# Patient Record
Sex: MALE | Race: WHITE | URBAN - METROPOLITAN AREA
[De-identification: names, ages, dates, MRNs, and addresses within clinical notes are randomized per-mention and may not be internally consistent; named-entity substitution may affect disease eponyms.]

---

## 2018-01-01 ENCOUNTER — EXTERNAL NURSING HOME DOCUMENTATION (OUTPATIENT)
Dept: INTERNAL MEDICINE CLINIC | Age: 68
End: 2018-01-01

## 2018-01-01 DIAGNOSIS — J96.01 ACUTE RESPIRATORY FAILURE WITH HYPOXIA (HCC): ICD-10-CM

## 2018-01-01 DIAGNOSIS — D64.9 ANEMIA, UNSPECIFIED TYPE: ICD-10-CM

## 2018-01-01 DIAGNOSIS — I71.40 ABDOMINAL AORTIC ANEURYSM (AAA) WITHOUT RUPTURE: ICD-10-CM

## 2018-01-01 DIAGNOSIS — J96.01 ACUTE RESPIRATORY FAILURE WITH HYPOXIA (HCC): Primary | ICD-10-CM

## 2018-01-01 DIAGNOSIS — Z72.0 TOBACCO USE: ICD-10-CM

## 2018-01-01 DIAGNOSIS — J44.9 CHRONIC OBSTRUCTIVE PULMONARY DISEASE, UNSPECIFIED COPD TYPE (HCC): Primary | ICD-10-CM

## 2018-01-01 DIAGNOSIS — R91.1 PULMONARY NODULE: ICD-10-CM

## 2018-01-01 DIAGNOSIS — K76.9 LIVER LESION: ICD-10-CM

## 2018-01-01 DIAGNOSIS — J44.9 CHRONIC OBSTRUCTIVE PULMONARY DISEASE, UNSPECIFIED COPD TYPE (HCC): ICD-10-CM

## 2018-01-01 DIAGNOSIS — D69.6 THROMBOCYTOPENIA (HCC): ICD-10-CM

## 2018-01-01 DIAGNOSIS — R91.8 PULMONARY NODULES: ICD-10-CM

## 2018-01-01 DIAGNOSIS — L30.9 DERMATITIS: Primary | ICD-10-CM

## 2018-01-01 DIAGNOSIS — J44.1 COPD EXACERBATION (HCC): ICD-10-CM

## 2018-01-01 DIAGNOSIS — J44.1 COPD EXACERBATION (HCC): Primary | ICD-10-CM

## 2018-01-01 DIAGNOSIS — R55 SYNCOPE, UNSPECIFIED SYNCOPE TYPE: ICD-10-CM

## 2018-01-01 RX ORDER — SENNOSIDES 8.6 MG/1
1 TABLET ORAL DAILY
Qty: 30 TAB | Refills: 0 | Status: SHIPPED | OUTPATIENT
Start: 2018-01-01

## 2018-01-01 RX ORDER — POLYETHYLENE GLYCOL 3350 17 G/17G
17 POWDER, FOR SOLUTION ORAL
Qty: 510 G | Refills: 0 | Status: SHIPPED | OUTPATIENT
Start: 2018-01-01

## 2018-01-01 RX ORDER — DOCUSATE SODIUM 100 MG/1
100 CAPSULE, LIQUID FILLED ORAL 2 TIMES DAILY
Qty: 60 CAP | Refills: 0 | Status: SHIPPED | OUTPATIENT
Start: 2018-01-01

## 2018-01-01 RX ORDER — BUDESONIDE AND FORMOTEROL FUMARATE DIHYDRATE 160; 4.5 UG/1; UG/1
2 AEROSOL RESPIRATORY (INHALATION) 2 TIMES DAILY
Qty: 1 INHALER | Refills: 0 | Status: SHIPPED | OUTPATIENT
Start: 2018-01-01

## 2018-01-01 RX ORDER — ALBUTEROL SULFATE 0.83 MG/ML
2.5 SOLUTION RESPIRATORY (INHALATION)
Qty: 30 EACH | Refills: 0 | Status: SHIPPED | OUTPATIENT
Start: 2018-01-01

## 2018-01-01 RX ORDER — METOPROLOL TARTRATE 25 MG/1
12.5 TABLET, FILM COATED ORAL 2 TIMES DAILY
Qty: 30 TAB | Refills: 0 | Status: SHIPPED | OUTPATIENT
Start: 2018-01-01

## 2018-09-14 NOTE — PROGRESS NOTES
History of Present Illness:   Ms. Leon Wang is a 79year-old white male with past medical history significant for COPD and tobacco abuse and chronic thrombocytopenia. He presented to the [facility] after transfer from [facility] for management of acute hypoxic respiratory failure. The patient had a CT scan done, which was suspicious of aspiration pneumonia with underlying COPD. He uses home oxygen, but also uses an inhaler and Spiriva on admission. He requires 4 liters of oxygen and was started on Augmentin. Slowly in the hospital, his oxygen has been weaned off and titrated down to 2 liters. Cough has improved. He was stabilized and was transferred to [facility]. He is doing well here in the rehab not in any distress. Past Medical History: 1. Acute hypoxic respiratory failure. 2. Syncope. 3. COPD. 4. Pulmonary nodule. 5. Infrarenal AAA. 6. Dental abscess. 7. hyperkeratosis of foot. Past Surgical History:   Bilateral cataract surgery with intraocular lens implantation. Social History:  The patient is a smoker. He smoked two packs per day from age 15. He never drinks any alcohol. He lives by himself in an apartment. Family History:  Not significant for any respiratory illness. Allergies:  He is not allergic to any medications. Medications:  Tylenol 325 mg two tablets every four hours as needed, Albuterol nebulizer every six hours as needed, Augmentin 875 mg every 12 hours for ten days, Budesonide/formeterol 80/4.5 mcg two puffs twice a day, Colace 100 mg tablets twice a day, Ipratropium solution one vial every six hours as needed, Metoprolol 12.5 mg twice a day, Senna 8.6 mg tablets every day. Review of Systems:  A complete review of systems was done. Right now, essentially negative. Physical Examination:    
GENERAL:  This is a pleasant, white male not appearing in any distress. VITALS:  T:  98.3 degrees Fahrenheit. P:  94 per minute. BP:  132/88 mmHg. SaO2:  100% on room air. Weight is 103 pounds. HEENT:  No abnormality detected. NECK:  Supple, no JVD, no carotid bruits, no thyromegaly. CHEST:  Chest is clear to auscultation bilaterally. No rales, no rhonchi. CARDIOVASCULAR:  S1, S2 normal.  Regular rate and rhythm. ABDOMEN:  Soft, nontender, nondistended, bowel sounds present. EXTREMITIES:  No edema is noticed. Dorsalis pedis pulse normal.   
NEUROLOGICAL:  Alert and oriented x3. Cranial nerves II - XII grossly intact. Motor is 5/5 bilaterally. Sensory is within normal limits. Assessment and Plan: 1. Acute hypoxic respiratory failure due to COPD exacerbation and aspiration pneumonia. CT of chest confirmed pneumonia. The patient has received IV antibiotics followed by p.o. antibiotics. Right now, no cough. Pneumonia has resolved. We will continue antibiotics, Symbicort and DuoNeb nebulizer and Spiriva. He will follow up with pulmonary. 2. History of syncope. The patient had workup done. EEG was done, which was normal.  It was probably cardiac or pulmonary. Other possibilities are vasovagal syncope too. The patient will continue Metoprolol twice a day for MAT. 3.  hyperkeratosis of the foot. Patient is to continue Vaseline for now. 4. Anemia. Last hemoglobin was 10.6. He is stable right now. 5. Infrarenal AAA. The patient had a CT of the abdomen that showed small infrarenal AAA with slight increase in size. We will continue to monitor it. 6. Liver lesion. Cyst versus hemangioma. Ultrasound at [facility] has evidence of mild hepatic parenchymal disease. No inpatient intervention was advised right now. 7. Pulmonary nodules. Given history of smoking, high risk for malignancy. The patient needs a CT of the chest repeated in three months.    
 
 
THIS IS NOT A COMPLETE MEDICAL RECORD ON THIS PATIENT.   
 THIS IS A PATIENT AT [facility]. PLEASE CONTACT THE FACILITY LISTED BELOW FOR MORE INFORMATION ON THIS PATIENT.   
THE COMPLETE RECORD RESIDES WITH THIS LONG TERM CARE FACILITY

## 2018-09-17 NOTE — PROGRESS NOTES
THIS IS NOT A COMPLETE MEDICAL RECORD ON THIS PATIENT. THIS IS A PATIENT AT [facility]. PLEASE CONTACT THE FACILITY LISTED BELOW FOR MORE INFORMATION ON THIS PATIENT. THE COMPLETE RECORD RESIDES WITH THIS LONG TERM CARE FACILITY. HISTORY OF PRESENT ILLNESS Radha Franklin is a 79 y.o. male. This patient is currently under the care of Dr. Angela Kitchen at [facility]. 
The patient was admitted to this facility following hospitalization related to acute respiratory failure. His past medical history includes chronic thrombocytopenia, anemia, AAA, COPD, and pulmonary nodules. The patient is seen today for follow-up. He states he is feeling well at time of visit. He denies chest pain and shortness of breath. Nursing does not report any concerns or changes in condition at this time. HPI Review of Systems Constitutional: Negative for chills and fever. HENT: Negative for congestion. Respiratory: Negative for cough and shortness of breath. Cardiovascular: Negative for chest pain. Gastrointestinal: Negative for abdominal pain. Genitourinary: Negative. Musculoskeletal: Negative. Neurological: Negative for dizziness and headaches. Endo/Heme/Allergies: Negative. Psychiatric/Behavioral: Negative. Physical Exam  
Constitutional: He is oriented to person, place, and time. He appears well-developed. No distress. HENT:  
Head: Normocephalic and atraumatic. Cardiovascular: Normal rate and regular rhythm. Pulmonary/Chest: Effort normal and breath sounds normal. No respiratory distress. He has no wheezes. He has no rales. Abdominal: Soft. Bowel sounds are normal. He exhibits no distension. There is no tenderness. Musculoskeletal: He exhibits no edema. Neurological: He is alert and oriented to person, place, and time. Skin: Skin is warm and dry. Psychiatric: He has a normal mood and affect. ASSESSMENT and PLAN Encounter Diagnoses Name Primary?  Chronic obstructive pulmonary disease, unspecified COPD type ([facility] [address] Yes  Tobacco use  Thrombocytopenia ([facility]  Anemia, unspecified type Continue PT/OT as tolerated. Reviewed medications and side effects in detail. Continue current medications at this time. CBC, CMP next lab day. Nursing to continue to monitor closely and notify MD/NP of any change in condition.

## 2018-09-25 PROBLEM — I71.40 ABDOMINAL AORTIC ANEURYSM (AAA) WITHOUT RUPTURE: Status: ACTIVE | Noted: 2018-01-01

## 2018-09-25 PROBLEM — J96.01 ACUTE RESPIRATORY FAILURE WITH HYPOXIA (HCC): Status: ACTIVE | Noted: 2018-01-01

## 2018-09-25 PROBLEM — J44.1 COPD EXACERBATION (HCC): Status: ACTIVE | Noted: 2018-01-01

## 2018-09-25 PROBLEM — D64.9 ANEMIA: Status: ACTIVE | Noted: 2018-01-01

## 2018-09-25 PROBLEM — K76.9 LIVER LESION: Status: ACTIVE | Noted: 2018-01-01

## 2018-10-02 NOTE — PROGRESS NOTES
THIS IS NOT A COMPLETE MEDICAL RECORD ON THIS PATIENT. THIS IS A PATIENT AT [facility]. PLEASE CONTACT THE FACILITY LISTED BELOW FOR MORE INFORMATION ON THIS PATIENT. THE COMPLETE RECORD RESIDES WITH THIS LONG TERM CARE FACILITY. HISTORY OF PRESENT ILLNESS Chetan Horn is a 79 y.o. male. This patient is currently under the care of Dr. Frederic Gudino at [facility]. 
The patient was admitted to this facility following hospitalization related to acute respiratory failure. His past medical history includes chronic thrombocytopenia, anemia, AAA, COPD, and pulmonary nodules. The patient is seen today for follow-up per family request.  
Patient states he is feeling well at time of visit. He denies chest pain and shortness of breath. Patient does have complaints of itching to left forearm. He requests a steroid cream. 
Nursing does not report any concerns or changes in condition at this time. HPI Review of Systems Constitutional: Negative for chills and fever. HENT: Negative for congestion. Respiratory: Negative for cough and shortness of breath. Cardiovascular: Negative for chest pain. Gastrointestinal: Negative for abdominal pain. Genitourinary: Negative. Musculoskeletal: Negative. Skin: Positive for itching. Neurological: Negative for dizziness and headaches. Endo/Heme/Allergies: Negative. Psychiatric/Behavioral: Negative. Physical Exam  
Constitutional: He is oriented to person, place, and time. He appears well-developed. No distress. HENT:  
Head: Normocephalic. Cardiovascular: Normal rate and regular rhythm. Pulmonary/Chest: Effort normal and breath sounds normal. No respiratory distress. He has no wheezes. He has no rales. Abdominal: Soft. He exhibits no distension. Musculoskeletal: He exhibits no edema. Neurological: He is alert and oriented to person, place, and time. Skin: Skin is warm and dry. Fading bruising to left forearm, non-tender Mild excoriation to left forearm Psychiatric: He has a normal mood and affect. ASSESSMENT and PLAN Encounter Diagnoses Name Primary?  Dermatitis Yes  Chronic obstructive pulmonary disease, unspecified COPD type ([address]  Pulmonary nodule  Abdominal aortic aneurysm (AAA) without rupture ([address]  Anemia, unspecified type Continue PT/OT as tolerated. Reviewed medications and side effects in detail. Will order Kenalog 0.1% cream- apply to affected area of left forearm bid x 10 days in thin layer. Continue other medications at this time. Nursing to continue to monitor closely and notify MD/NP of any change in condition.  
 
Reviewed plan of care and recommendations for follow-up with [facility] after discharge with patient's sister, Danielle Gonzales, via telephone, per request.

## 2018-10-05 NOTE — PROGRESS NOTES
THIS IS NOT A COMPLETE MEDICAL RECORD ON THIS PATIENT. THIS IS A PATIENT AT [facility]. PLEASE CONTACT THE FACILITY LISTED BELOW FOR MORE INFORMATION ON THIS PATIENT. THE COMPLETE RECORD RESIDES WITH THIS LONG TERM CARE FACILITY. HISTORY OF PRESENT ILLNESS Roly Menard is a 79 y.o. male. This patient is currently under the care of Dr. Bert Merritt at [facility]. 
The patient was admitted to this facility following hospitalization related to acute respiratory failure. His past medical history includes chronic thrombocytopenia, anemia, AAA, COPD, and pulmonary nodules. Per social work, the patient is scheduled to discharge home 10/8/18. Patient states he is generally feeling well at time of today's visit and prepared for discharge. Nursing does not report any concerns or changes in condition at this time. HPI Review of Systems Constitutional: Negative for chills and fever. HENT: Negative for congestion. Respiratory: Negative for cough and shortness of breath. Cardiovascular: Negative for chest pain. Gastrointestinal: Negative for abdominal pain. Genitourinary: Negative. Musculoskeletal: Negative. Neurological: Negative for dizziness and headaches. Endo/Heme/Allergies: Negative. Psychiatric/Behavioral: Negative. Physical Exam  
Constitutional: He is oriented to person, place, and time. He appears well-developed. No distress. HENT:  
Head: Normocephalic and atraumatic. Cardiovascular: Normal rate and regular rhythm. Pulmonary/Chest: Effort normal and breath sounds normal. No respiratory distress. He has no wheezes. He has no rales. Abdominal: Soft. Bowel sounds are normal. He exhibits no distension. There is no tenderness. Musculoskeletal: He exhibits no edema. Neurological: He is alert and oriented to person, place, and time. Skin: Skin is warm and dry. Psychiatric: He has a normal mood and affect.   
 
 
ASSESSMENT and PLAN 
 Encounter Diagnoses Name Primary?  Chronic obstructive pulmonary disease, unspecified COPD type ([facility] [address] Yes  Anemia, unspecified type  Abdominal aortic aneurysm (AAA) without rupture ([facility] [address]  Pulmonary nodules Patient to have home health services upon discharge. Will provide prescriptions for 30 day supply of medications at discharge. Patient to follow-up with PCP within 2 weeks of discharge and specialists as scheduled. [facility] via telephone call with patient's sister. Nursing to continue to monitor closely and notify MD/NP of any change in condition prior to discharge.

## 2022-07-28 NOTE — PROGRESS NOTES
Home Health need continues for: wound care and catheter care  Primary diagnoses/co-morbidities/recent procedures in past 60 days that impact current episode: multiple pressure ulcers  Current level of functional ability: requires assistance   Homebound status and living arrangements: pt paraplegic and requires human assistance and assistance with wheelchair to leave home  Goals accomplished and/or measurable progress toward unmet goals  in past 60 days:  pt would like wound to heal  Focus of care for next 60 days for each discipline ordered: ongoing management of sp catheter and wound care  Skin integrity/wound status: wounds on buttock, perineum, and R side  Code status:   Most recent fall risk: high Progress Note Subjective:   Selene Ortiz was admitted to the hospital with COPD exacerbation. He is in rehab right now. No cough or wheezing right now. He continues with physical therapy and occupational therapy. He has a good appetite. No other discomfort. Objective: VITALS:  T:  97.3 degrees Fahrenheit. P:  78 per minute. BP:  112/69 mmHg. SaO2:  96% on room air. Weight is 103 pounds. HEENT:  No abnormality detected. NECK:  Supple, no JVD, no carotid bruits, no thyromegaly. CHEST:  Chest is clear to auscultation bilaterally. No rales, no rhonchi. CARDIOVASCULAR:  S1, S2 normal.  Regular rate and rhythm. ABDOMEN:  Soft, nontender, nondistended, bowel sounds present. EXTREMITIES:  No edema is noticed. Dorsalis pedis pulse normal.   
  
Assessment and Plan:  
1. COPD exacerbation. The patient is on DuoNeb nebulizer, Spiriva and Symbicort. Breathing seems improved. He finished up Prednisone. 2. Pulmonary nodule. The patient has a history of smoking. He will have repeat CT scan of the chest in three months. 3. Infrarenal aortic aneurysm. Repeat ultrasound showed slightly improved size. 4. Gait weakness. Patient to continue physical therapy and occupational therapy. 5. Hyperkeratosis of the foot. The patient is using Vaseline. He is improving slowly. THIS IS NOT A COMPLETE MEDICAL RECORD ON THIS PATIENT.   
THIS IS A PATIENT AT [facility]. PLEASE CONTACT THE FACILITY LISTED BELOW FOR MORE INFORMATION ON THIS PATIENT.   
THE COMPLETE RECORD RESIDES WITH THIS LONG TERM CARE FACILITY